# Patient Record
Sex: FEMALE | Race: WHITE
[De-identification: names, ages, dates, MRNs, and addresses within clinical notes are randomized per-mention and may not be internally consistent; named-entity substitution may affect disease eponyms.]

---

## 2021-12-23 ENCOUNTER — APPOINTMENT (OUTPATIENT)
Dept: INTERNAL MEDICINE | Facility: CLINIC | Age: 81
End: 2021-12-23
Payer: MEDICARE

## 2021-12-23 VITALS
HEIGHT: 65 IN | OXYGEN SATURATION: 97 % | DIASTOLIC BLOOD PRESSURE: 72 MMHG | SYSTOLIC BLOOD PRESSURE: 146 MMHG | BODY MASS INDEX: 28.32 KG/M2 | HEART RATE: 74 BPM | WEIGHT: 170 LBS | TEMPERATURE: 98 F

## 2021-12-23 DIAGNOSIS — Z78.9 OTHER SPECIFIED HEALTH STATUS: ICD-10-CM

## 2021-12-23 DIAGNOSIS — K62.5 HEMORRHAGE OF ANUS AND RECTUM: ICD-10-CM

## 2021-12-23 PROCEDURE — 36415 COLL VENOUS BLD VENIPUNCTURE: CPT

## 2021-12-23 PROCEDURE — 99203 OFFICE O/P NEW LOW 30 MIN: CPT | Mod: 25

## 2021-12-23 NOTE — HISTORY OF PRESENT ILLNESS
[FreeTextEntry1] : Found to be anemic and had Colonoscopy on Saturday. Told had Sessile polyp. at transverse colon. and a rectal polyp. Dr. de Cade, colorectal surgeon. apparently polyp may have not been fully removed.\par H/o Hypothyroidism on Homeopathic meds. She has no GI  symptoms. \par  She does not take NSAID

## 2021-12-27 LAB
BASOPHILS # BLD AUTO: 0.07 K/UL
BASOPHILS NFR BLD AUTO: 1.4 %
EOSINOPHIL # BLD AUTO: 0.16 K/UL
EOSINOPHIL NFR BLD AUTO: 3.2 %
FERRITIN SERPL-MCNC: 16 NG/ML
FOLATE SERPL-MCNC: 19.6 NG/ML
HCT VFR BLD CALC: 41.1 %
HGB BLD-MCNC: 12.8 G/DL
IMM GRANULOCYTES NFR BLD AUTO: 0.2 %
IRON SATN MFR SERPL: 15 %
IRON SERPL-MCNC: 57 UG/DL
LYMPHOCYTES # BLD AUTO: 1.52 K/UL
LYMPHOCYTES NFR BLD AUTO: 30.5 %
MAN DIFF?: NORMAL
MCHC RBC-ENTMCNC: 26.8 PG
MCHC RBC-ENTMCNC: 31.1 GM/DL
MCV RBC AUTO: 86.2 FL
MONOCYTES # BLD AUTO: 0.49 K/UL
MONOCYTES NFR BLD AUTO: 9.8 %
NEUTROPHILS # BLD AUTO: 2.74 K/UL
NEUTROPHILS NFR BLD AUTO: 54.9 %
PLATELET # BLD AUTO: 323 K/UL
RBC # BLD: 4.77 M/UL
RBC # FLD: 16.4 %
TIBC SERPL-MCNC: 368 UG/DL
UIBC SERPL-MCNC: 311 UG/DL
VIT B12 SERPL-MCNC: 641 PG/ML
WBC # FLD AUTO: 4.99 K/UL

## 2022-01-02 ENCOUNTER — APPOINTMENT (OUTPATIENT)
Dept: FAMILY MEDICINE | Facility: CLINIC | Age: 82
End: 2022-01-02
Payer: MEDICARE

## 2022-01-02 VITALS
DIASTOLIC BLOOD PRESSURE: 76 MMHG | TEMPERATURE: 97 F | BODY MASS INDEX: 28.32 KG/M2 | HEART RATE: 92 BPM | HEIGHT: 65 IN | SYSTOLIC BLOOD PRESSURE: 140 MMHG | OXYGEN SATURATION: 97 % | WEIGHT: 170 LBS | RESPIRATION RATE: 16 BRPM

## 2022-01-02 DIAGNOSIS — D53.9 NUTRITIONAL ANEMIA, UNSPECIFIED: ICD-10-CM

## 2022-01-02 DIAGNOSIS — E03.9 HYPOTHYROIDISM, UNSPECIFIED: ICD-10-CM

## 2022-01-02 DIAGNOSIS — K63.5 POLYP OF COLON: ICD-10-CM

## 2022-01-02 DIAGNOSIS — E21.3 HYPERPARATHYROIDISM, UNSPECIFIED: ICD-10-CM

## 2022-01-02 PROCEDURE — 99204 OFFICE O/P NEW MOD 45 MIN: CPT | Mod: 25

## 2022-01-05 NOTE — PHYSICAL EXAM
[No Acute Distress] : no acute distress [Well Nourished] : well nourished [Well Developed] : well developed [Well-Appearing] : well-appearing [Normal Sclera/Conjunctiva] : normal sclera/conjunctiva [PERRL] : pupils equal round and reactive to light [EOMI] : extraocular movements intact [Normal Outer Ear/Nose] : the outer ears and nose were normal in appearance [Normal Oropharynx] : the oropharynx was normal [No JVD] : no jugular venous distention [No Lymphadenopathy] : no lymphadenopathy [Supple] : supple [Thyroid Normal, No Nodules] : the thyroid was normal and there were no nodules present [No Respiratory Distress] : no respiratory distress  [No Accessory Muscle Use] : no accessory muscle use [Clear to Auscultation] : lungs were clear to auscultation bilaterally [Normal Rate] : normal rate  [Regular Rhythm] : with a regular rhythm [Normal S1, S2] : normal S1 and S2 [No Murmur] : no murmur heard [No Carotid Bruits] : no carotid bruits [No Abdominal Bruit] : a ~M bruit was not heard ~T in the abdomen [No Varicosities] : no varicosities [Pedal Pulses Present] : the pedal pulses are present [No Edema] : there was no peripheral edema [No Palpable Aorta] : no palpable aorta [No Extremity Clubbing/Cyanosis] : no extremity clubbing/cyanosis [Soft] : abdomen soft [Non-distended] : non-distended [Non Tender] : non-tender [No Masses] : no abdominal mass palpated [No HSM] : no HSM [Normal Bowel Sounds] : normal bowel sounds [Normal Posterior Cervical Nodes] : no posterior cervical lymphadenopathy [Normal Anterior Cervical Nodes] : no anterior cervical lymphadenopathy [No CVA Tenderness] : no CVA  tenderness [No Spinal Tenderness] : no spinal tenderness [No Joint Swelling] : no joint swelling [Grossly Normal Strength/Tone] : grossly normal strength/tone [No Rash] : no rash [Coordination Grossly Intact] : coordination grossly intact [No Focal Deficits] : no focal deficits [Normal Gait] : normal gait [Deep Tendon Reflexes (DTR)] : deep tendon reflexes were 2+ and symmetric [Normal Affect] : the affect was normal [Normal Insight/Judgement] : insight and judgment were intact [de-identified] : red, irritated, excoriated area of left inner labia

## 2022-01-05 NOTE — HISTORY OF PRESENT ILLNESS
[FreeTextEntry1] : new patient with multiple issues [de-identified] : 1. anemia, seeing dr. kaiser, hgb improved. \par 2. history of hyperparathyroid, last PTH normal 50 in 9/2021, had partial parathyroidectomy\par 3. 2001, "misdiagnosed" ovarian cancer; had TAHBSO and appendectomy\par 4. spinal stenosis, seeing calos and portillo\par 5. weight up\par 6. labial irritation; had seen dermatologist, who recommended nystatin, and PCP f/u\par \par

## 2022-01-06 DIAGNOSIS — N95.2 POSTMENOPAUSAL ATROPHIC VAGINITIS: ICD-10-CM

## 2022-01-06 LAB
CANDIDA VAG CYTO: NOT DETECTED
G VAGINALIS+PREV SP MTYP VAG QL MICRO: NOT DETECTED
T VAGINALIS VAG QL WET PREP: NOT DETECTED

## 2022-02-09 RX ORDER — ESTRADIOL 0.1 MG/G
0.1 CREAM VAGINAL
Qty: 1 | Refills: 3 | Status: ACTIVE | COMMUNITY
Start: 2022-01-06 | End: 1900-01-01

## 2022-04-05 ENCOUNTER — APPOINTMENT (OUTPATIENT)
Dept: ORTHOPEDIC SURGERY | Facility: CLINIC | Age: 82
End: 2022-04-05
Payer: MEDICARE

## 2022-04-05 DIAGNOSIS — M48.062 SPINAL STENOSIS, LUMBAR REGION WITH NEUROGENIC CLAUDICATION: ICD-10-CM

## 2022-04-05 DIAGNOSIS — M43.17 SPONDYLOLISTHESIS, LUMBOSACRAL REGION: ICD-10-CM

## 2022-04-05 DIAGNOSIS — M54.6 PAIN IN THORACIC SPINE: ICD-10-CM

## 2022-04-05 PROCEDURE — 99214 OFFICE O/P EST MOD 30 MIN: CPT

## 2022-04-08 ENCOUNTER — APPOINTMENT (OUTPATIENT)
Dept: MRI IMAGING | Facility: CLINIC | Age: 82
End: 2022-04-08
Payer: MEDICARE

## 2022-04-08 PROCEDURE — 72146 MRI CHEST SPINE W/O DYE: CPT | Mod: NC,MH

## 2022-04-08 PROCEDURE — 72146 MRI CHEST SPINE W/O DYE: CPT | Mod: MH

## 2022-04-26 ENCOUNTER — APPOINTMENT (OUTPATIENT)
Dept: ORTHOPEDIC SURGERY | Facility: CLINIC | Age: 82
End: 2022-04-26

## 2022-04-28 ENCOUNTER — APPOINTMENT (OUTPATIENT)
Dept: ORTHOPEDIC SURGERY | Facility: CLINIC | Age: 82
End: 2022-04-28
Payer: MEDICARE

## 2022-04-28 DIAGNOSIS — M47.814 SPONDYLOSIS W/OUT MYELOPATHY OR RADICULOPATHY, THORACIC REGION: ICD-10-CM

## 2022-04-28 PROCEDURE — 99213 OFFICE O/P EST LOW 20 MIN: CPT

## 2022-04-28 NOTE — ASSESSMENT
[FreeTextEntry1] : MRI T spine:  shows mild mod spondylosis ;  which correlates with her presentation ;  there is superimposed scoliosis;  PT is warranted;  there is no overt pathology that would obligate any kind of surgery;  going to refer her to physiatry colleagues to guide her care (nonoperative management) \par \par  I agree that the MRI is consistent with a benign lesion;  the presence of a blastic lesion does not fit her clinical picture; \par her presentation does not obligate a CT scan;

## 2022-04-28 NOTE — IMAGING
[de-identified] : no focal motor nor sensory deficits;  no evidence of root tension nor compression;

## 2022-04-28 NOTE — HISTORY OF PRESENT ILLNESS
[Neck] : neck [Mid-back] : mid-back [Gradual] : gradual [7] : 7 [5] : 5 [Constant] : constant [Retired] : Work status: retired [de-identified] : [facility] [] : no [FreeTextEntry1] : T SPINE

## 2022-05-10 NOTE — HISTORY OF PRESENT ILLNESS
[Neck] : neck [Mid-back] : mid-back [Gradual] : gradual [6] : 6 [5] : 5 [Dull/Aching] : dull/aching [Stabbing] : stabbing [Walking/activity] : walking/activity [Physical therapy] : physical therapy [Sitting] : sitting [Walking] : walking [Retired] : Work status: retired [de-identified] : 82 yo f with onset of variable low back pain since july 2021. She was lying in bed and sprung up to get the phone when she felt pain. Pain intermittently into the right posterior leg and both feet. Difficulty sleeping. Occasional stabbing pain, spasms. Similar issue in 2018 that resolved spontaneously. +Tylenol. Also with chronic neck pain for years, shooting into b/l [place]. [] : no [FreeTextEntry5] : pt states she has been experiencing pain on her back/ neck .

## 2022-05-10 NOTE — ASSESSMENT
[FreeTextEntry1] : MRI:  there is severe two level stenosis and one level with moderate;  IF she opts for surgery then an L3-5 lami and fusion would be indicated in order to address all sites of stenosis and to stabilize the slip;  the decompression at 3-4 would have to be carried out laterally to the extent that the level would need to also be stabilzed in order to avoid iatrogenic instability;  BUT it is her mid back that she seeks care for;\par \par we need an MRI of her T spine in order to determine if there is a fracture that would lend itself to a kyphopplasty for instance;  but if there isn't an acute process then there would not be a surgical solution, and she would need to rely on pain colleagues/physiatry

## 2022-08-15 ENCOUNTER — APPOINTMENT (OUTPATIENT)
Dept: ORTHOPEDIC SURGERY | Facility: CLINIC | Age: 82
End: 2022-08-15

## 2022-08-15 VITALS — HEIGHT: 65 IN | WEIGHT: 170 LBS | BODY MASS INDEX: 28.32 KG/M2

## 2022-08-15 DIAGNOSIS — M70.71 OTHER BURSITIS OF HIP, RIGHT HIP: ICD-10-CM

## 2022-08-15 PROCEDURE — 99214 OFFICE O/P EST MOD 30 MIN: CPT

## 2022-08-15 PROCEDURE — 73502 X-RAY EXAM HIP UNI 2-3 VIEWS: CPT | Mod: RT

## 2022-08-15 RX ORDER — METHYLPREDNISOLONE 4 MG/1
4 TABLET ORAL
Qty: 1 | Refills: 0 | Status: ACTIVE | COMMUNITY
Start: 2022-08-15 | End: 1900-01-01

## 2022-08-15 NOTE — ASSESSMENT
[FreeTextEntry1] : discussed her options including mdp, and or csi troch bursa, she has f/u with hip specialist Dr Torres on Thursday\par \par reviewed her pelvis xrays from other physician from june- normal

## 2022-08-15 NOTE — HISTORY OF PRESENT ILLNESS
[Lower back] : lower back [8] : 8 [Dull/Aching] : dull/aching [2] : 2 [Radiating] : radiating [Sharp] : sharp [Constant] : constant [Standing] : standing [Walking] : walking [Stairs] : stairs [Retired] : Work status: retired [de-identified] : 8-15-22- KNown L/S stenosis had seen elieser prior. She has been in therapy and states she had a twisting episode about 4-6 weeks ago since then pain ant lat illiopsoas region. She has since had pain with ambulating using a cane and presents today in a wheelchair.  [] : Post Surgical Visit: no [FreeTextEntry1] : Rt hip [FreeTextEntry5] : patient complains of pain in the lower back pain that radiates down the right hip and leg\par h/o of lower back pain,  seen Dr. Bhardwaj before,

## 2022-08-18 ENCOUNTER — APPOINTMENT (OUTPATIENT)
Dept: ORTHOPEDIC SURGERY | Facility: CLINIC | Age: 82
End: 2022-08-18

## 2022-08-18 VITALS — BODY MASS INDEX: 30.73 KG/M2 | HEIGHT: 64 IN | WEIGHT: 180 LBS

## 2022-08-18 DIAGNOSIS — M70.60 TROCHANTERIC BURSITIS, UNSPECIFIED HIP: ICD-10-CM

## 2022-08-18 DIAGNOSIS — Z78.9 OTHER SPECIFIED HEALTH STATUS: ICD-10-CM

## 2022-08-18 DIAGNOSIS — M89.8X8 OTHER SPECIFIED DISORDERS OF BONE, OTHER SITE: ICD-10-CM

## 2022-08-18 DIAGNOSIS — M70.61 TROCHANTERIC BURSITIS, RIGHT HIP: ICD-10-CM

## 2022-08-18 PROCEDURE — 99204 OFFICE O/P NEW MOD 45 MIN: CPT

## 2022-08-18 PROCEDURE — 99214 OFFICE O/P EST MOD 30 MIN: CPT

## 2022-08-18 NOTE — IMAGING
[de-identified] : Constitutional: well developed and well nourished, able to communicate\par Cardiovascular: Peripheral vascular exam is grossly normal\par Neurologic: Alert and oriented, no acute distress.\par Skin: normal skin with no ulcers, rashes, or lesions\par Pulmonary: No respiratory distress, breathing comfortably on room air\par Lymphatics: No obvious lymphadenopathy or lymphedema in areas examined\par \par LOWER EXTREMITY/ RIGHT HIP EXAM\par Standing pelvic alignment: Symmetric with no Trendelenburg\par Atrophy: none\par Ecchymosis/swelling: none\par \par Range of Motion\par Hip: Flexion/extension/ER/IR    FL 90/ EX 20/ ER 45/ IR 20 / AB 60 /AD 20\par Impingement with flexion adduction and internal rotation: negative\par Contracture: none\par Snapping hip: negative\par Greater trochanter: no tenderness\par + TTP Iliac crest and around ASIS\par \par \par IMAGING:\par 08/18/2022 Xrays of the Right hip 3v were taken previously demonstrating no signs of fractures, dislocations,or significant arthritis.

## 2022-08-18 NOTE — ASSESSMENT
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

## 2022-08-18 NOTE — HISTORY OF PRESENT ILLNESS
[Gradual] : gradual [8] : 8 [0] : 0 [Dull/Aching] : dull/aching [Stabbing] : stabbing [Intermittent] : intermittent [Heat] : heat [Walking] : walking [Retired] : Work status: retired [de-identified] : 8.18.22 Patiet she had an injury that occurred the 07/20/22 to the right hip that's been getting worse and pain makes bearing weight difficult. Pain on the right pelvis after turning on the right side on a hard table.  [] : no [FreeTextEntry7] : right buttocks [de-identified] : bearing weight

## 2022-08-23 ENCOUNTER — APPOINTMENT (OUTPATIENT)
Dept: ORTHOPEDIC SURGERY | Facility: CLINIC | Age: 82
End: 2022-08-23

## 2022-08-23 PROCEDURE — 99214 OFFICE O/P EST MOD 30 MIN: CPT

## 2022-08-23 NOTE — HISTORY OF PRESENT ILLNESS
[Right Leg] : right leg [Gradual] : gradual [8] : 8 [7] : 7 [Localized] : localized [Nothing helps with pain getting better] : Nothing helps with pain getting better [Standing] : standing [Walking] : walking [Lying in bed] : lying in bed [de-identified] :  9/28/21: 82 yo f with onset of variable low back pain since july 2021. She was lying in bed and\par sprung up to get the phone when she felt pain. Pain intermittently into the right posterior leg and both feet. Difficulty\par sleeping. Occasional stabbing pain, spasms. Similar issue in 2018 that resolved spontaneously. +Tylenol. Also with\par chronic neck pain for years, shooting into b/l traps.\par \par 10/26/21: f/u low back to review the MRI and DEXA scan. Increasing pain to the left side of the back/buttock with\par doing HEP. PT/HEP with improvement. She saw Dr Cortez for pain management. Where LESI was recommended, but\par she is hesistant due to risks.\par \par 12/14/21 f/u lbp More good days, Acupuncture/chiro PT helpful\par \par 1/25/22 f/u lbp better w/ PT had episode getting out of bed 12/20 slipped R/L sided stmptoms\par \par 2/22/22: Here for fu on the low back pain. She notes much improvement in PT. There is less leg pain. Pain can be\par variable. Using cane, might have flared up the right sided neck pain. Increase in lower back pain leaning over the sink on\par \par 2/18, mid thoracic back. Tried heat/ice/tylenol. Worse with sitting down. Flare up of pain to the right side of the neck\par that has been excruciating.\par \par 3/22/22 f/u lbp improving w/ PT Rheum/Pain eval pending\par \par 8/23/22:pt is here today for a follow up on the right leg and low back pain. Massage therapy helpful  Tumeric tocicity/iron deficient anemia EMG neuropathy [] : no [FreeTextEntry1] : leg [de-identified] : Dr meyer rheumatogist [de-identified] : massage therapy

## 2022-10-25 ENCOUNTER — APPOINTMENT (OUTPATIENT)
Dept: ORTHOPEDIC SURGERY | Facility: CLINIC | Age: 82
End: 2022-10-25

## 2022-10-25 DIAGNOSIS — M62.838 OTHER MUSCLE SPASM: ICD-10-CM

## 2022-10-25 DIAGNOSIS — G62.9 POLYNEUROPATHY, UNSPECIFIED: ICD-10-CM

## 2022-10-25 DIAGNOSIS — M81.0 AGE-RELATED OSTEOPOROSIS W/OUT CURRENT PATHOLOGICAL FRACTURE: ICD-10-CM

## 2022-10-25 DIAGNOSIS — M76.891 OTHER SPECIFIED ENTHESOPATHIES OF RIGHT LOWER LIMB, EXCLUDING FOOT: ICD-10-CM

## 2022-10-25 DIAGNOSIS — M50.90 CERVICAL DISC DISORDER, UNSPECIFIED, UNSPECIFIED CERVICAL REGION: ICD-10-CM

## 2022-10-25 DIAGNOSIS — S30.1XXA CONTUSION OF ABDOMINAL WALL, INITIAL ENCOUNTER: ICD-10-CM

## 2022-10-25 PROCEDURE — 99213 OFFICE O/P EST LOW 20 MIN: CPT

## 2022-10-25 NOTE — PHYSICAL EXAM
[Right] : right hip [5___] : flexion 5[unfilled]/5 [Rotation to left] : rotation to left [] : no pain with log rolling

## 2022-10-25 NOTE — HISTORY OF PRESENT ILLNESS
[Right Leg] : right leg [Gradual] : gradual [8] : 8 [7] : 7 [Localized] : localized [Nothing helps with pain getting better] : Nothing helps with pain getting better [Standing] : standing [Walking] : walking [Lying in bed] : lying in bed [de-identified] : 9/28/21: 82 yo f with onset of variable low back pain since July 2021. She was lying in bed and sprung up to get the phone when she felt pain. Pain intermittently into the right posterior leg and both feet. Difficulty sleeping. Occasional stabbing pain, spasms. Similar issue in 2018 that resolved spontaneously. +Tylenol. Also with chronic neck pain for years, shooting into b/l traps.\par \par 10/26/21: f/u low back to review the MRI and DEXA scan. Increasing pain to the left side of the back/buttock with\par doing HEP. PT/HEP with improvement. She saw Dr Cortez for pain management. Where LESI was recommended, but\par she is hesitant due to risks.\par \par 12/14/21 f/u LBP More good days, Acupuncture/chiro PT helpful\par \par 1/25/22 f/u LBP better w/ PT had episode getting out of bed 12/20 slipped R/L sided symptoms\par \par 2/22/22: Here for fu on the low back pain. She notes much improvement in PT. There is less leg pain. Pain can be\par variable. Using cane, might have flared up the right sided neck pain. Increase in lower back pain leaning over the sink on\par \par 2/18, mid thoracic back. Tried heat/ice/Tylenol. Worse with sitting down. Flare up of pain to the right side of the neck\par that has been excruciating.\par \par 3/22/22 f/u LBP improving w/ PT Rheum/Pain eval pending\par \par 8/23/22:pt is here today for a follow up on the right leg and low back pain. Massage therapy helpful  Tumeric tocicity/iron deficient anemia EMG neuropathy\par \par 10/25/22: F/U R leg LBP- increasing pain to the right side of the pelvis after turning on her right side on a hard table in 7/20/2022. Extreme pain during massage therapy which caused a setback. Tumeric toxicity causing iron deficiency anemia. Negative colonoscopy and FOBT. Will be re starting PT today. Using cane.  [FreeTextEntry1] : leg [] : no [de-identified] : Dr Sanderson rheumatologist [de-identified] : massage therapy

## 2023-04-11 ENCOUNTER — APPOINTMENT (OUTPATIENT)
Dept: ORTHOPEDIC SURGERY | Facility: CLINIC | Age: 83
End: 2023-04-11
Payer: MEDICARE

## 2023-04-11 DIAGNOSIS — M48.061 SPINAL STENOSIS, LUMBAR REGION WITHOUT NEUROGENIC CLAUDICATION: ICD-10-CM

## 2023-04-11 DIAGNOSIS — M47.816 SPONDYLOSIS W/OUT MYELOPATHY OR RADICULOPATHY, LUMBAR REGION: ICD-10-CM

## 2023-04-11 DIAGNOSIS — M54.16 RADICULOPATHY, LUMBAR REGION: ICD-10-CM

## 2023-04-11 PROCEDURE — 99213 OFFICE O/P EST LOW 20 MIN: CPT

## 2023-04-11 NOTE — HISTORY OF PRESENT ILLNESS
[Right Leg] : right leg [Gradual] : gradual [8] : 8 [7] : 7 [Localized] : localized [Nothing helps with pain getting better] : Nothing helps with pain getting better [Standing] : standing [Walking] : walking [Lying in bed] : lying in bed [de-identified] : 9/28/21: 80 yo f with onset of variable low back pain since July 2021. She was lying in bed and sprung up to get the phone when she felt pain. Pain intermittently into the right posterior leg and both feet. Difficulty sleeping. Occasional stabbing pain, spasms. Similar issue in 2018 that resolved spontaneously. +Tylenol. Also with chronic neck pain for years, shooting into b/l traps.\par \par 10/26/21: f/u low back to review the MRI and DEXA scan. Increasing pain to the left side of the back/buttock with\par doing HEP. PT/HEP with improvement. She saw Dr Cortez for pain management. Where LESI was recommended, but\par she is hesitant due to risks.\par \par 12/14/21 f/u LBP More good days, Acupuncture/Chiro PT helpful\par \par 1/25/22 f/u LBP better w/ PT had episode getting out of bed 12/20 slipped R/L sided symptoms\par \par 2/22/22: Here for fu on the low back pain. She notes much improvement in PT. There is less leg pain. Pain can be\par variable. Using cane, might have flared up the right sided neck pain. Increase in lower back pain leaning over the sink on\par \par 2/18, mid thoracic back. Tried heat/ice/Tylenol. Worse with sitting down. Flare up of pain to the right side of the neck\par that has been excruciating.\par \par 3/22/22 f/u LBP improving w/ PT Rheum/Pain eval pending\par \par 8/23/22:pt is here today for a follow up on the right leg and low back pain. Massage therapy helpful  Tumeric toxicity/iron deficient anemia EMG neuropathy\par \par 10/25/22: F/U R leg LBP- increasing pain to the right side of the pelvis after turning on her right side on a hard table in 7/20/2022. Extreme pain during massage therapy which caused a setback. Tumeric toxicity causing iron deficiency anemia. Negative colonoscopy and FOBT. Will be re starting PT today. Using cane. \par \par 4/11/23: F/U R leg- Pt reports seeing a physiatrist recently, had MRI and nuclear medicine bone scan which r/o cancer. Has increasing right leg pain. Taking neurontin. Tightness, soreness continues, but pain is less. She is doing HEP. Changed her diet, decreasing sugar. [] : no [FreeTextEntry1] : leg [de-identified] : Dr Sanderson rheumatologist [de-identified] : massage therapy

## 2023-04-11 NOTE — DISCUSSION/SUMMARY
[de-identified] : Will continue PT. Neurontin\par Endocrinology eval today; new onset hyperparathyroidism\par f/u 3 months

## 2023-04-11 NOTE — PHYSICAL EXAM
[Rotation to left] : rotation to left [Right] : right hip [5___] : flexion 5[unfilled]/5 [de-identified] : outside the house  [] : no pain with log rolling

## 2023-07-25 ENCOUNTER — APPOINTMENT (OUTPATIENT)
Dept: ORTHOPEDIC SURGERY | Facility: CLINIC | Age: 83
End: 2023-07-25